# Patient Record
Sex: FEMALE | Race: WHITE
[De-identification: names, ages, dates, MRNs, and addresses within clinical notes are randomized per-mention and may not be internally consistent; named-entity substitution may affect disease eponyms.]

---

## 2018-10-19 ENCOUNTER — HOSPITAL ENCOUNTER (EMERGENCY)
Dept: HOSPITAL 74 - FER | Age: 58
Discharge: HOME | End: 2018-10-19
Payer: COMMERCIAL

## 2018-10-19 VITALS — HEART RATE: 90 BPM | TEMPERATURE: 98.5 F | SYSTOLIC BLOOD PRESSURE: 117 MMHG | DIASTOLIC BLOOD PRESSURE: 57 MMHG

## 2018-10-19 VITALS — BODY MASS INDEX: 25 KG/M2

## 2018-10-19 DIAGNOSIS — K57.92: Primary | ICD-10-CM

## 2018-10-19 LAB
ALBUMIN SERPL-MCNC: 3.7 G/DL (ref 3.4–5)
ALP SERPL-CCNC: 95 U/L (ref 45–117)
ALT SERPL-CCNC: 21 U/L (ref 13–61)
ANION GAP SERPL CALC-SCNC: 10 MMOL/L (ref 8–16)
APPEARANCE UR: CLEAR
AST SERPL-CCNC: 22 U/L (ref 15–37)
BASOPHILS # BLD: 0.3 % (ref 0–2)
BILIRUB SERPL-MCNC: 0.6 MG/DL (ref 0.2–1)
BILIRUB UR STRIP.AUTO-MCNC: NEGATIVE MG/DL
BUN SERPL-MCNC: 8 MG/DL (ref 7–18)
CALCIUM SERPL-MCNC: 8.9 MG/DL (ref 8.5–10.1)
CHLORIDE SERPL-SCNC: 103 MMOL/L (ref 98–107)
CO2 SERPL-SCNC: 26 MMOL/L (ref 21–32)
COLOR UR: COLORLESS
CREAT SERPL-MCNC: 0.9 MG/DL (ref 0.55–1.3)
DEPRECATED RDW RBC AUTO: 13.2 % (ref 11.6–15.6)
EOSINOPHIL # BLD: 0.2 % (ref 0–4.5)
EPITH CASTS URNS QL MICRO: (no result) /HPF
GLUCOSE SERPL-MCNC: 99 MG/DL (ref 74–106)
HCT VFR BLD CALC: 37.7 % (ref 32.4–45.2)
HGB BLD-MCNC: 12.7 GM/DL (ref 10.7–15.3)
KETONES UR QL STRIP: (no result)
LEUKOCYTE ESTERASE UR QL STRIP.AUTO: (no result)
LYMPHOCYTES # BLD: 25.8 % (ref 8–40)
MCH RBC QN AUTO: 29.9 PG (ref 25.7–33.7)
MCHC RBC AUTO-ENTMCNC: 33.8 G/DL (ref 32–36)
MCV RBC: 88.6 FL (ref 80–96)
MONOCYTES # BLD AUTO: 6.6 % (ref 3.8–10.2)
NEUTROPHILS # BLD: 67.1 % (ref 42.8–82.8)
NITRITE UR QL STRIP: NEGATIVE
PH UR: 6 [PH] (ref 5–8)
PLATELET # BLD AUTO: 260 K/MM3 (ref 134–434)
PMV BLD: 8.3 FL (ref 7.5–11.1)
POTASSIUM SERPLBLD-SCNC: 3.8 MMOL/L (ref 3.5–5.1)
PROT SERPL-MCNC: 7 G/DL (ref 6.4–8.2)
PROT UR QL STRIP: NEGATIVE
PROT UR QL STRIP: NEGATIVE
RBC # BLD AUTO: 4.26 M/MM3 (ref 3.6–5.2)
SODIUM SERPL-SCNC: 139 MMOL/L (ref 136–145)
SP GR UR: 1 (ref 1.01–1.03)
UROBILINOGEN UR STRIP-MCNC: NEGATIVE MG/DL (ref 0.2–1)
WBC # BLD AUTO: 7.4 K/MM3 (ref 4–10)

## 2018-10-19 PROCEDURE — 3E0337Z INTRODUCTION OF ELECTROLYTIC AND WATER BALANCE SUBSTANCE INTO PERIPHERAL VEIN, PERCUTANEOUS APPROACH: ICD-10-PCS

## 2018-10-19 PROCEDURE — 3E033NZ INTRODUCTION OF ANALGESICS, HYPNOTICS, SEDATIVES INTO PERIPHERAL VEIN, PERCUTANEOUS APPROACH: ICD-10-PCS

## 2018-10-19 PROCEDURE — 3E03329 INTRODUCTION OF OTHER ANTI-INFECTIVE INTO PERIPHERAL VEIN, PERCUTANEOUS APPROACH: ICD-10-PCS

## 2018-10-19 NOTE — PDOC
History of Present Illness





- General


Chief Complaint: Pain, Acute


Stated Complaint: LLQ ABD PAIN


Time Seen by Provider: 10/19/18 00:19


History Source: Patient


Exam Limitations: No Limitations





- History of Present Illness


Initial Comments: 





10/19/18 00:41


 This is a healthy 58-year-old who comes in complaining of left lower quadrant 

abdominal pain 2 days. Patient said pain is associated with some intermittent 

diarrhea. Patient said crack pain is crampy in nature with intermittent 

episodes where addition sharp in nature. Patient denies any fevers or chills. 

Patient's complaining of some anorexia. Patient denies any nausea vomiting or 

diarrhea. Patient is otherwise healthy and denies any other medical problems





PAST MEDICAL HISTORY:  no significant history





PAST SURGICAL HISTORY:  no significant history





FAMILY HISTORY:  no pertinant history





SOCIAL HISTORY:  Pt lives with  family and is employed.





MEDICATIONS:  reviewed





ALLERGIES:  As per nursing notes





ROS


General:  No fevers or chills, no weakness, no weight loss 


HEENT: No change in vision.  No sore throat,. No ear pain


CardioVascular:  No chest pain or shortness of breath


Respiratory:No cough, or wheezing. 


Gastrointestinal:  no nausea, vomiting, diarrhea or constipation,  No rectal 

bleeding, + abdominal pain


Genitourinary:  No dysuria, hematuria, or frequency


Musculoskeletal: . No joint pain or swelling


Neurologic: No headache, vertigo, dizziness or loss of consciousness


Psychiatric: nor depression 


Skin: No rashes or easy bruising


Endocrine: no increased thirst or abnormal weight change


Allergic: no skin or latex allergy


All other systems reviewed and normal





Exam:





General: Well-nourished well-developed individual, no acute distress


HEENT: Throat: Normal, tonsils normal, no erythema or exudate


               Neck: Supple, no meningeal signs, no lymphadenopathy


Eyes::Pupils equal reactive and round, extraocular motion intact


Chest: Nontender to palpation 


Cardiac: S1-S2 normal, regular rate and rhythm, no murmurs rubs or gallops


Respiratory: Lungs clear to auscultation bilateral


Abdomen: Soft, nondistended, normal bowel sounds, there is tenderness on 

palpation across the lower abdomen with more tenderness left lower quadrant. 

There is some mild guarding in the left lower quadrant but no rebound.


Extremities: Warm, dry, no cyanosis, clubbing, or edema


Skin: No rashes


Neuro: Alert and oriented x3, CN II - XII intact, nonfocal exam with normal 

strength, normal sensation, normal reflexes, normal gait, 


Psych: Normal mood and affect





Assessment and plan: This is a 58-year-old female who comes in complaining of 

lower abdominal pain. Workup was initiated including CBC, comp, CAT scan 

abdomen and pelvis. Patient given fluids and pain medication.











Past History





- Past Medical History


Allergies/Adverse Reactions: 


 Allergies











Allergy/AdvReac Type Severity Reaction Status Date / Time


 


No Known Allergies Allergy   Unverified 10/19/18 00:15











Home Medications: 


Ambulatory Orders





Multivitamins [Tab-A-Vit -] 1 tab PO DAILY 10/19/18 


levoFLOXacin [Levaquin -] 500 mg PO DAILY #20 tablet 10/19/18 


metroNIDAZOLE [Flagyl -] 500 mg PO DAILY #20 tablet 10/19/18 








COPD: No


GI Disorders: Yes (DIVERTICULOSIS)





- Surgical History


Appendectomy: Yes





- Suicide/Smoking/Psychosocial Hx


Smoking History: Never smoked





*Physical Exam





- Vital Signs


 Last Vital Signs











Temp Pulse Resp BP Pulse Ox


 


 98.5 F   90   16   117/57 L  98 


 


 10/19/18 00:16  10/19/18 00:16  10/19/18 00:16  10/19/18 00:16  10/19/18 00:16














ED Treatment Course





- LABORATORY


CBC & Chemistry Diagram: 


 10/19/18 00:45





 10/19/18 00:45





*DC/Admit/Observation/Transfer


Diagnosis at time of Disposition: 


 Diverticulitis








- Discharge Dispostion


Disposition: HOME


Condition at time of disposition: Stable


Decision to Admit order: No





- Prescriptions


Prescriptions: 


levoFLOXacin [Levaquin -] 500 mg PO DAILY #20 tablet


metroNIDAZOLE [Flagyl -] 500 mg PO DAILY #20 tablet





- Referrals


Referrals: 


Leslee Dominguez MD [Primary Care Provider] - 





- Patient Instructions


Additional Instructions: 


Take Levaquin 1 tablet a day for 10 days.





Take Flagyl 1 tablet twice a day for 10 days





Tylenol or Motrin as needed for pain





Return to the emergency department immediately with ANY new, persistent or 

worsening symptoms.





Continue any medications as previously prescribed by your physician.





You should follow up with your primary doctor as soon as possible regarding 

today's emergency department visit.


.


Please make sure your doctor reviews the results of your emergency evaluation.





Thank you for coming to the   Emergency Department today for your care. It was 

a pleasure to see you today. Please note that your evaluation is INCOMPLETE 

until you  follow-up with your doctor. 








Diverticulitis Diet





A diverticulitis diet is recommend as part of a short-term treatment plan for 

acute diverticulitis.





Diverticula are small, bulging pouches that can form in the lining of the 

digestive system. They're found most often in the lower part of the large 

intestine (colon). This condition is called diverticulosis.





In some cases, one or more of the pouches become inflamed or infected. This is 

known as diverticulitis.





Mild cases of diverticulitis are usually treated with antibiotics and a 

diverticulitis diet, which includes clear liquids and low-fiber foods. More-

severe cases typically require hospitalization.





A diverticulitis diet is a temporary measure to give your digestive system a 

chance to rest. Oral intake is usually reduced until bleeding and diarrhea 

subside.





A diverticulitis diet starts with only clear liquids for a few days. Examples 

of items allowed on a clear liquid diet include:





Broth


Fruit juices without pulp, such as apple juice


Ice chips


Ice pops without bits of fruit or fruit pulp


Gelatin


Water


Tea or coffee without cream


As you start feeling better, your doctor will recommend that you slowly add low-

fiber foods. Examples of low-fiber foods include:





Canned or cooked fruits without skin or seeds


Canned or cooked vegetables such as green beans, carrots and potatoes (without 

the skin)


Eggs, fish and poultry


Refined white bread


Fruit and vegetable juice with no pulp


Low-fiber cereals


Milk, yogurt and cheese  eat  yogurt at least once a day as it will help 

replenish the healthy bacteria in your intestines that the antibiotics kill. 


White rice, pasta and noodles


You should feel better within two or three days of starting the diet and 

antibiotics. If you haven't started feeling better by then, call your doctor. 

Also contact your doctor if:





You develop a fever


Your abdominal pain is worsening


You're unable to keep clear liquids down


These may indicate a complication that requires hospitalization.





The diverticulitis diet has few risks. However, continuing a clear liquid diet 

for more than a few days can lead to weakness and other complications, since it 

doesn't provide enough of the nutrients your body needs. For this reason, I 

recommend  you to transition to the low fiberl diet as soon as you can tolerate 

it and then back to a normal diet once you finish the antibiotics. 











- Post Discharge Activity